# Patient Record
Sex: FEMALE | Race: WHITE | NOT HISPANIC OR LATINO
[De-identification: names, ages, dates, MRNs, and addresses within clinical notes are randomized per-mention and may not be internally consistent; named-entity substitution may affect disease eponyms.]

---

## 2018-06-26 PROBLEM — Z00.00 ENCOUNTER FOR PREVENTIVE HEALTH EXAMINATION: Status: ACTIVE | Noted: 2018-06-26

## 2018-07-26 ENCOUNTER — APPOINTMENT (OUTPATIENT)
Dept: BREAST CENTER | Facility: CLINIC | Age: 38
End: 2018-07-26
Payer: COMMERCIAL

## 2018-07-26 VITALS
HEART RATE: 81 BPM | HEIGHT: 65 IN | SYSTOLIC BLOOD PRESSURE: 132 MMHG | DIASTOLIC BLOOD PRESSURE: 92 MMHG | WEIGHT: 170 LBS | BODY MASS INDEX: 28.32 KG/M2

## 2018-07-26 DIAGNOSIS — Z78.9 OTHER SPECIFIED HEALTH STATUS: ICD-10-CM

## 2018-07-26 DIAGNOSIS — Z85.3 PERSONAL HISTORY OF MALIGNANT NEOPLASM OF BREAST: ICD-10-CM

## 2018-07-26 DIAGNOSIS — Z80.51 FAMILY HISTORY OF MALIGNANT NEOPLASM OF KIDNEY: ICD-10-CM

## 2018-07-26 PROCEDURE — 99214 OFFICE O/P EST MOD 30 MIN: CPT

## 2018-11-15 ENCOUNTER — RECORD ABSTRACTING (OUTPATIENT)
Age: 38
End: 2018-11-15

## 2018-11-15 DIAGNOSIS — G43.909 MIGRAINE, UNSPECIFIED, NOT INTRACTABLE, W/OUT STATUS MIGRAINOSUS: ICD-10-CM

## 2018-11-15 LAB — CYTOLOGY CVX/VAG DOC THIN PREP: NORMAL

## 2018-12-04 ENCOUNTER — APPOINTMENT (OUTPATIENT)
Dept: OBGYN | Facility: CLINIC | Age: 38
End: 2018-12-04

## 2019-01-11 ENCOUNTER — APPOINTMENT (OUTPATIENT)
Dept: OBGYN | Facility: HOSPITAL | Age: 39
End: 2019-01-11

## 2019-01-31 ENCOUNTER — APPOINTMENT (OUTPATIENT)
Dept: BREAST CENTER | Facility: CLINIC | Age: 39
End: 2019-01-31
Payer: COMMERCIAL

## 2019-01-31 VITALS
DIASTOLIC BLOOD PRESSURE: 77 MMHG | WEIGHT: 163 LBS | HEIGHT: 65 IN | SYSTOLIC BLOOD PRESSURE: 118 MMHG | BODY MASS INDEX: 27.16 KG/M2 | HEART RATE: 80 BPM

## 2019-01-31 PROCEDURE — 99214 OFFICE O/P EST MOD 30 MIN: CPT

## 2019-01-31 RX ORDER — CHROMIUM 200 MCG
1000 TABLET ORAL DAILY
Refills: 0 | Status: DISCONTINUED | COMMUNITY
End: 2019-01-31

## 2019-01-31 RX ORDER — ASPIRIN 81 MG
81 TABLET,CHEWABLE ORAL
Refills: 0 | Status: ACTIVE | COMMUNITY

## 2019-01-31 RX ORDER — LEUPROLIDE ACETATE 11.25 MG
KIT INTRAMUSCULAR
Refills: 0 | Status: DISCONTINUED | COMMUNITY
End: 2019-01-31

## 2019-01-31 NOTE — HISTORY OF PRESENT ILLNESS
[FreeTextEntry1] : S/P L Exc Bx (8/27/07): +1.5cm IDCA, +margin, ER+, VA+, Her2 3+\par S/P L Re-Exc/L Ax Diss (10/5/07): No resid Ca,  +2/12 LN\par L Stage IIA (T1N1M0) IDCA\par S/P chemo+Herceptin (Mittelman)\par S/P RT\par Started tmx/Lupron/ASAb (4/08) > Lupron d/c'ed after BSO\par BRCA (9/07): -\par S/P Lap BSO (1/19)([facility]): pending\par No other MH/FH changes. ROS reviewed/discussed. Taking Ca/Vit D. Last Bone Densitometry (5/16): +osteopenia\par Mammo/Sono (6/22/18): NSF

## 2019-01-31 NOTE — PHYSICAL EXAM
[Normocephalic] : normocephalic [Atraumatic] : atraumatic [Supple] : supple [No Supraclavicular Adenopathy] : no supraclavicular adenopathy [No Cervical Adenopathy] : no cervical adenopathy [No Thyromegaly] : no thyromegaly [Normal Sinus Rhythm] : normal sinus rhythm [Examined in the supine and seated position] : examined in the supine and seated position [No dominant masses] : no dominant masses in right breast  [No dominant masses] : no dominant masses left breast [No Nipple Retraction] : no left nipple retraction [No Nipple Discharge] : no left nipple discharge [No Axillary Lymphadenopathy] : no left axillary lymphadenopathy [No Edema] : no edema [No Rashes] : no rashes [No Ulceration] : no ulceration [de-identified] : PMX/Ax/RT. NER. %. No lymphedema.

## 2019-02-06 ENCOUNTER — APPOINTMENT (OUTPATIENT)
Dept: OBGYN | Facility: CLINIC | Age: 39
End: 2019-02-06
Payer: COMMERCIAL

## 2019-02-06 VITALS
BODY MASS INDEX: 27.99 KG/M2 | DIASTOLIC BLOOD PRESSURE: 70 MMHG | WEIGHT: 168 LBS | SYSTOLIC BLOOD PRESSURE: 118 MMHG | HEIGHT: 65 IN

## 2019-02-06 DIAGNOSIS — Z12.31 ENCOUNTER FOR SCREENING MAMMOGRAM FOR MALIGNANT NEOPLASM OF BREAST: ICD-10-CM

## 2019-02-06 PROCEDURE — 99024 POSTOP FOLLOW-UP VISIT: CPT

## 2019-02-06 NOTE — HISTORY OF PRESENT ILLNESS
[Clean/Dry/Intact] : clean, dry and intact [Healed] : healed [Doing Well] : is doing well [No Sign of Infection] : is showing no signs of infection [Sutures Removed] : sutures were removed

## 2019-09-12 ENCOUNTER — APPOINTMENT (OUTPATIENT)
Dept: BREAST CENTER | Facility: CLINIC | Age: 39
End: 2019-09-12
Payer: COMMERCIAL

## 2019-09-12 VITALS
HEIGHT: 65 IN | HEART RATE: 85 BPM | BODY MASS INDEX: 28.32 KG/M2 | DIASTOLIC BLOOD PRESSURE: 85 MMHG | WEIGHT: 170 LBS | SYSTOLIC BLOOD PRESSURE: 122 MMHG

## 2019-09-12 DIAGNOSIS — Z98.890 OTHER SPECIFIED POSTPROCEDURAL STATES: ICD-10-CM

## 2019-09-12 PROCEDURE — 99214 OFFICE O/P EST MOD 30 MIN: CPT

## 2019-09-12 NOTE — HISTORY OF PRESENT ILLNESS
[FreeTextEntry1] : S/P L Exc Bx (8/27/07): +1.5cm IDCA, +margin, ER+, [place]+, Her2 3+\par S/P L Re-Exc/L Ax Diss (10/5/07): No resid Ca,  +2/12 LN\par L Stage IIA (T1N1M0) IDCA\par S/P chemo+Herceptin (Mittelman)\par S/P RT\par Started tmx/Lupron/ASAb (4/08) > Lupron d/c'ed after BSO\par BRCA (9/07): -\par S/P R Sono Core Bx (R 11:00)(9/19/07): Benign\par S/P Lap BSO (1/19)([facility]): Benign\par No other MH/FH changes. ROS reviewed/discussed. Taking Ca/Vit D. Last Bone Densitometry (5/16): +osteopenia\par Mammo/Sono (7/5/19): HD, NSF

## 2019-09-12 NOTE — PHYSICAL EXAM
[Normocephalic] : normocephalic [Atraumatic] : atraumatic [Supple] : supple [No Supraclavicular Adenopathy] : no supraclavicular adenopathy [No Thyromegaly] : no thyromegaly [Normal Sinus Rhythm] : normal sinus rhythm [Examined in the supine and seated position] : examined in the supine and seated position [No dominant masses] : no dominant masses in right breast  [No dominant masses] : no dominant masses left breast [No Nipple Retraction] : no left nipple retraction [No Nipple Discharge] : no left nipple discharge [No Axillary Lymphadenopathy] : no left axillary lymphadenopathy [No Edema] : no edema [No Rashes] : no rashes [No Ulceration] : no ulceration [de-identified] : +tender nod th R 8:00 N5 > observe [de-identified] : S/P PMX/Ax/RT. NER. %. No lymphedema.

## 2019-10-10 ENCOUNTER — APPOINTMENT (OUTPATIENT)
Dept: OBGYN | Facility: CLINIC | Age: 39
End: 2019-10-10
Payer: COMMERCIAL

## 2019-10-10 PROCEDURE — 76830 TRANSVAGINAL US NON-OB: CPT

## 2019-10-10 PROCEDURE — 99396 PREV VISIT EST AGE 40-64: CPT

## 2020-05-05 VITALS
SYSTOLIC BLOOD PRESSURE: 130 MMHG | DIASTOLIC BLOOD PRESSURE: 79 MMHG | BODY MASS INDEX: 24.75 KG/M2 | HEIGHT: 64 IN | HEART RATE: 71 BPM | WEIGHT: 145 LBS

## 2020-09-21 ENCOUNTER — APPOINTMENT (OUTPATIENT)
Dept: BREAST CENTER | Facility: CLINIC | Age: 40
End: 2020-09-21
Payer: COMMERCIAL

## 2020-09-21 VITALS
DIASTOLIC BLOOD PRESSURE: 78 MMHG | WEIGHT: 170 LBS | HEART RATE: 90 BPM | HEIGHT: 65 IN | BODY MASS INDEX: 28.32 KG/M2 | SYSTOLIC BLOOD PRESSURE: 127 MMHG

## 2020-09-21 PROCEDURE — 99214 OFFICE O/P EST MOD 30 MIN: CPT

## 2020-09-21 RX ORDER — ERGOCALCIFEROL 1.25 MG/1
1.25 MG CAPSULE, LIQUID FILLED ORAL
Qty: 12 | Refills: 0 | Status: ACTIVE | COMMUNITY
Start: 2020-04-28

## 2020-09-21 NOTE — HISTORY OF PRESENT ILLNESS
[FreeTextEntry1] : S/P L Exc Bx (8/27/07): +1.5cm IDCA, +margin, ER+, [place]+, Her2 3+\par S/P L Re-Exc/L Ax Diss (10/5/07): No resid Ca,  +2/12 LN\par L Stage IIA (T1N1M0) IDCA\par S/P chemo+Herceptin (Mittelman)\par S/P RT\par Started tmx/Lupron/ASAb (4/08) > Lupron d/c'ed after BSO\par BRCA (9/07): -\par S/P R Sono Core Bx (R 11:00)(9/19/07): Benign\par S/P Lap BSO (1/19)([facility]): Benign\par No other MH/FH changes. ROS reviewed/discussed. Taking Ca/Vit D. Last Bone Densitometry (5/16): +osteopenia\par Mammo/Sono (7/6/20): HD, NSF

## 2020-09-21 NOTE — PHYSICAL EXAM
[Normocephalic] : normocephalic [Atraumatic] : atraumatic [Supple] : supple [No Supraclavicular Adenopathy] : no supraclavicular adenopathy [No Cervical Adenopathy] : no cervical adenopathy [No Thyromegaly] : no thyromegaly [Normal Sinus Rhythm] : normal sinus rhythm [Examined in the supine and seated position] : examined in the supine and seated position [No dominant masses] : no dominant masses in right breast  [No dominant masses] : no dominant masses left breast [No Nipple Retraction] : no left nipple retraction [No Nipple Discharge] : no left nipple discharge [No Axillary Lymphadenopathy] : no left axillary lymphadenopathy [No Edema] : no edema [No Rashes] : no rashes [No Ulceration] : no ulceration [de-identified] : +stable sl tender FF th R 8:00 N5 [de-identified] : S/P PMX/Ax/RT. NER. %. No lymphedema.

## 2020-10-19 ENCOUNTER — APPOINTMENT (OUTPATIENT)
Dept: OBGYN | Facility: CLINIC | Age: 40
End: 2020-10-19
Payer: COMMERCIAL

## 2020-10-19 VITALS
BODY MASS INDEX: 29.32 KG/M2 | HEIGHT: 65 IN | SYSTOLIC BLOOD PRESSURE: 116 MMHG | DIASTOLIC BLOOD PRESSURE: 70 MMHG | TEMPERATURE: 97.7 F | WEIGHT: 176 LBS

## 2020-10-19 PROCEDURE — 99396 PREV VISIT EST AGE 40-64: CPT

## 2020-10-19 NOTE — HISTORY OF PRESENT ILLNESS
[FreeTextEntry1] : 41yo  postmenopausal(Lupron induced)on Tamoxifen  since treatment for ER/IN + [id number] Negative Left breast Ca (Lumpectomy/SNB, chemo,RT) followed by Laparoscopic BSO 19 here for annual Gyn exam. Pt has had no vaginal staining or bleeding since last year. Pt had been on Tamoxifen and Lupron(Dr. Joshua) x 10 years and stopped Tamoxifen in 2018. When Lupron was temporarily stopped ovarian function returned therefore pt strongly desired BSO to remain without any Estrogen production. [Mammogramdate] : 7/6/20 [TextBox_19] : [facility] BIRADS 2D [BreastSonogramDate] : 7/6/20 [PapSmeardate] : 10/4/18 [TextBox_25] : BIRADS 2D [TextBox_31] : Neg/HPV Neg [TextBox_37] : T Score Spine -1.2 Hip -0.7 Fem Neck -1.3 [BoneDensityDate] : 6/22/18

## 2021-01-21 ENCOUNTER — APPOINTMENT (OUTPATIENT)
Dept: OBGYN | Facility: CLINIC | Age: 41
End: 2021-01-21

## 2021-03-25 ENCOUNTER — APPOINTMENT (OUTPATIENT)
Dept: BREAST CENTER | Facility: CLINIC | Age: 41
End: 2021-03-25
Payer: COMMERCIAL

## 2021-03-25 VITALS
HEIGHT: 65 IN | DIASTOLIC BLOOD PRESSURE: 85 MMHG | SYSTOLIC BLOOD PRESSURE: 139 MMHG | BODY MASS INDEX: 27.32 KG/M2 | WEIGHT: 164 LBS | HEART RATE: 85 BPM

## 2021-03-25 PROCEDURE — 99214 OFFICE O/P EST MOD 30 MIN: CPT

## 2021-03-25 PROCEDURE — 99072 ADDL SUPL MATRL&STAF TM PHE: CPT

## 2021-03-25 PROCEDURE — 99213 OFFICE O/P EST LOW 20 MIN: CPT

## 2021-03-25 NOTE — HISTORY OF PRESENT ILLNESS
[FreeTextEntry1] : S/P L Exc Bx (8/27/07): +1.5cm IDCA, +margin, ER+, [place]+, Her2 3+\par S/P L Re-Exc/L Ax Diss (10/5/07): No resid Ca,  +2/12 LN\par L Stage IIA (T1N1M0) IDCA\par S/P chemo+Herceptin (Mittelman)\par S/P RT\par Started tmx/Lupron/ASAb (4/08) > Lupron d/c'ed after BSO\par BRCA (9/07): -\par S/P R Sono Core Bx (R 11:00)(9/19/07): Benign\par S/P Lap BSO (1/19)([facility]): Benign\par Getting second Pfizer next wk\par No other MH/FH changes. ROS reviewed/discussed. Taking Ca/Vit D. Last Bone Densitometry (6/18): +osteopenia\par Mammo/Sono (7/6/20): HD, NSF

## 2021-03-25 NOTE — PHYSICAL EXAM

## 2021-07-18 ENCOUNTER — NON-APPOINTMENT (OUTPATIENT)
Age: 41
End: 2021-07-18

## 2021-09-27 ENCOUNTER — APPOINTMENT (OUTPATIENT)
Dept: BREAST CENTER | Facility: CLINIC | Age: 41
End: 2021-09-27
Payer: COMMERCIAL

## 2021-09-27 VITALS
SYSTOLIC BLOOD PRESSURE: 145 MMHG | HEIGHT: 65 IN | BODY MASS INDEX: 29.66 KG/M2 | WEIGHT: 178 LBS | DIASTOLIC BLOOD PRESSURE: 90 MMHG | HEART RATE: 76 BPM

## 2021-09-27 PROCEDURE — 99214 OFFICE O/P EST MOD 30 MIN: CPT

## 2021-09-27 NOTE — PHYSICAL EXAM

## 2021-09-27 NOTE — HISTORY OF PRESENT ILLNESS
[FreeTextEntry1] : S/P L Exc Bx (8/27/07): +1.5cm IDCA, +margin, ER+, [place]+, Her2 3+\par S/P L Re-Exc/L Ax Diss (10/5/07): No resid Ca,  +2/12 LN\par L Stage IIA (T1N1M0) IDCA\par S/P chemo+Herceptin (Mittelman)\par S/P RT\par Started tmx/Lupron/ASAb (4/08) > Lupron d/c'ed after BSO\par BRCA ([employer] compr)(9/07): -\par S/P R Sono Core Bx (R 11:00)(9/19/07): Benign\par S/P Lap BSO (1/19)([facility]): Benign\par Got second Pfizer (4/21)\par No other MH/FH changes. ROS reviewed/discussed. Taking Ca/Vit D. Last Bone Densitometry (6/18): +osteopenia\par Mammo/Sono (7/7/21): HD, NSF

## 2022-04-06 ENCOUNTER — APPOINTMENT (OUTPATIENT)
Dept: BREAST CENTER | Facility: CLINIC | Age: 42
End: 2022-04-06
Payer: COMMERCIAL

## 2022-04-06 VITALS
WEIGHT: 178 LBS | HEIGHT: 65 IN | BODY MASS INDEX: 29.66 KG/M2 | HEART RATE: 89 BPM | SYSTOLIC BLOOD PRESSURE: 124 MMHG | DIASTOLIC BLOOD PRESSURE: 87 MMHG

## 2022-04-06 DIAGNOSIS — Z87.42 PERSONAL HISTORY OF OTHER DISEASES OF THE FEMALE GENITAL TRACT: ICD-10-CM

## 2022-04-06 PROCEDURE — 99214 OFFICE O/P EST MOD 30 MIN: CPT

## 2022-04-06 NOTE — PHYSICAL EXAM

## 2022-04-06 NOTE — HISTORY OF PRESENT ILLNESS
[FreeTextEntry1] : S/P L Exc Bx (8/27/07): +1.5cm IDCA, +margin, ER+, [place]+, Her2 3+\par S/P L Re-Exc/L Ax Diss (10/5/07): No resid Ca,  +2/12 LN\par L Stage IIA (T1N1M0) IDCA\par S/P chemo+Herceptin (Mittelman)\par S/P RT\par Started tmx/Lupron/ASAb (4/08) > Lupron d/c'ed after BSO\par BRCA ([employer] compr)(9/07): -\par S/P R Sono Core Bx (R 11:00)(9/19/07): Benign\par S/P Lap BSO (1/19)([facility]): Benign\par Got Pfizer booster (11/21)(RUE)\par No other MH/FH changes. ROS reviewed/discussed. Taking Ca/Vit D. Last Bone Densitometry (6/18): +osteopenia\par Mammo/Sono (7/7/21): HD, NSF

## 2022-04-13 ENCOUNTER — APPOINTMENT (OUTPATIENT)
Dept: OBGYN | Facility: CLINIC | Age: 42
End: 2022-04-13
Payer: COMMERCIAL

## 2022-04-13 ENCOUNTER — LABORATORY RESULT (OUTPATIENT)
Age: 42
End: 2022-04-13

## 2022-04-13 ENCOUNTER — NON-APPOINTMENT (OUTPATIENT)
Age: 42
End: 2022-04-13

## 2022-04-13 DIAGNOSIS — Z01.419 ENCOUNTER FOR GYNECOLOGICAL EXAMINATION (GENERAL) (ROUTINE) W/OUT ABNORMAL FINDINGS: ICD-10-CM

## 2022-04-13 PROCEDURE — 99396 PREV VISIT EST AGE 40-64: CPT

## 2022-04-13 NOTE — HISTORY OF PRESENT ILLNESS
[FreeTextEntry1] : 42yo  postmenopausal(Lupron induced)on Tamoxifen since treatment for ER/MA + Nas8Zcc Negative Left breast Ca (Lumpectomy/SNB, chemo,RT) followed by Laparoscopic BSO 19 here for annual Gyn exam. Pt has had no vaginal staining or bleeding but occasionally sees a spot of blood on toilet tissue when she wipes(never on undergarments). Pt had been on Tamoxifen and Lupron(Dr. Joshua) x 10 years and stopped Tamoxifen in 2018. When Lupron was temporarily stopped ovarian function returned therefore pt strongly desired BSO to remain without any Estrogen production. She remains back on Tamoxifen  [Mammogramdate] : 7/7/21 [TextBox_19] : [facility] BIRADS 2D [BreastSonogramDate] : 7/7/21 [TextBox_25] : [facility] BIRADS 2 [PapSmeardate] : 10/4/18 [TextBox_31] : Neg/HPV Neg [BoneDensityDate] : 6/22/18 [TextBox_37] : T Score Spine -1.2 Hip -0.7 Fem Neck -1.3.

## 2022-04-19 LAB — CYTOLOGY CVX/VAG DOC THIN PREP: NORMAL

## 2022-06-14 ENCOUNTER — APPOINTMENT (OUTPATIENT)
Dept: HEMATOLOGY ONCOLOGY | Facility: CLINIC | Age: 42
End: 2022-06-14

## 2022-07-06 ENCOUNTER — NON-APPOINTMENT (OUTPATIENT)
Age: 42
End: 2022-07-06

## 2022-07-06 NOTE — DISCUSSION/SUMMARY
[FreeTextEntry1] : REASON FOR CONSULT\par Isabela Chavis is a 42-year-old female referred by Dr. Leo Gama for cancer genetic counseling and update testing due to a personal and family history of cancer. Ms. Chavis was seen on 2022 as a joint appointment with her mother, Loi Chavis, at which time medical and family history was ascertained and a pedigree constructed. Genetic counselor, Florina Delvalle, was also present for this session.\par \par RELEVANT MEDICAL HISTORY\par Ms. Chavis was diagnosed with a left breast cancer in  at the age of 27. Pathology report revealed invasive ductal carcinoma (ER+/ID+/HER2+). Consequently, she underwent lumpectomy, with chemotherapy, radiation therapy, and endocrine therapy. She pursued genetic testing through [facility] around her diagnosis which was negative for any variants in BRCA1 or BRCA2 (no FEI analysis). \par \par OTHER MEDICAL AND SURGICAL HISTORY:\par •	Medical History: osteopenia, migraines\par •	Surgical History: appendectomy, cervical cryosurgery, wisdom teeth\par \par OB/GYN HISTORY:\par Obstetrical History: \par Age at Menarche: 11\par Menopausal Status: Post-menopausal with LMP late 20s d/t chemo treatments \par Age at First Live Birth: N/A\par Oral Contraceptive Use: Yes, 7 years\par Hormone Replacement Therapy: No\par \par CANCER SCREENING HISTORY:  \par Breast: \par •	Mammography: 21- wnl\par •	Sonography: 21- wnl\par GYN:\par •	Pelvic Examination: 22- wnl\par Colon:\par •	Colonoscopy: No\par Skin:  \par •	FBSE: Yes\par •	Lesions biopsied/removed: No\par \par SOCIAL HISTORY:\par •	Tobacco-product use: Yes, former\par •	Environmental exposures: No\par \par FAMILY HISTORY:\par Maternal ancestry was reported as English/Georgian and paternal ancestry was reported as Tajik. Ashkenazi Zoroastrian ancestry was denied. A detailed family history of cancer was ascertained, see below and scanned chart for pedigree. \par \par According to Ms. Chavis no one else in the family has had germline testing for cancer susceptibility. Consanguinity was denied. \par 	\par RISK ASSESSMENT:\par Ms. Chavis’s personal and family history is suggestive of a hereditary cancer syndrome given her diagnosis of breast cancer at age 27 and her mother’s history of breast cancer. The patient meets National Comprehensive Cancer Network (NCCN) criteria for genetic testing. We recommended update genetic testing for genes associated with breast and gynecological cancer. This test analyzes 19 genes: DAVID, BARD1, BRCA1, BRCA2, BRIP1, CDH1, CHEK2, EPCAM, MLH1, MSH2, MSH6, NF1, PALB2, PMS2, PTEN, RAD51C, RAD51D, STK11, and TP53.\par \par The risks, benefits and limitations of genetic testing were discussed with Ms. Chavis. In addition, we discussed the purpose of genetic testing and possible test results (positive, negative, inconclusive) along with associated medical management options and psychosocial implications. Insurance coverage and potential out of pocket costs were also discussed. \par \par It was explained that risk assessment is based upon medical and family history as provided and may change in the future should new information be obtained. \par \par Following our discussion, Ms. Chavis consented to the above-mentioned genetic testing panel. Blood was drawn in our laboratory and sent to [facility] today.\par \par PLAN:\par \par 1.	Blood drawn today will be sent to [facility] for analysis. \par 2.	We will contact Ms. Chavis to schedule a follow-up appointment once the results are available. Results generally return in 2-3 weeks. \par \par For any additional questions please call Cancer Genetics at [phone]. \par \par \par Maris Omer MS, [facility] [place]par Genetic Counselor, Cancer Genetics\par \par \par CC: \par Leo Gama MD\par \par \par \par

## 2022-07-06 NOTE — DISCUSSION/SUMMARY
[FreeTextEntry1] : REASON FOR CONSULT\par Isabela Chavis is a 42-year-old female who was contacted on July 6, 2022 for a discussion regarding her variant of uncertain significance (VUS) genetic testing results related to hereditary cancer predisposition. This session was conducted via telephone. \par \par Ms. Chavis was originally seen by Cancer Genetics on June 14, 2022 for hereditary cancer predisposition risk assessment due to a personal and family history of cancer. At that time, Ms. Chavis decided to pursue genetic testing for genes associated with breast and gynecological cancers offered by [facility].\par \par TEST RESULTS:  \par \par VARIANT OF UNCERTAIN SIGNIFICANCE (VUS)- DAVID (c.[id number]; [id number])\par \par NO pathogenic (disease-causing) variants or additional VUSs were detected in the following genes [19]:  DAVID, BARD1, BRCA1, BRCA2, BRIP1, CDH1, CHEK2, EPCAM, MLH1, MSH2, MSH6, NF1, PALB2, PMS2, PTEN, RAD51C, RAD51D, STK11, and TP53.\par \par RESULTS INTERPRETATION AND ASSESSMENT:\par At this time the available evidence is insufficient to determine the role of this VUS in disease and the clinical significance of this result is uncertain. Individuals with a pathogenic mutation in the DAVID gene may have an increased risk for breast cancer, pancreatic cancer, and possibly ovarian cancer. It is unknown if the patient has an increased risk for the cancers associated with the DAVID gene at this time. \par \par The detection of this VUS does NOT currently change the patient’s medical management. It is NOT recommended at this time that family members use this result for predictive genetic testing or medical management decisions. With more research, a VUS may be reclassified as either disease-causing or benign. Ms. Chavis was encouraged to contact us every 2-3 years to enquire about any new information for this variant, or sooner if there are any changes in her personal or family history of cancer.  Such updates could possibly change our risk assessment and recommendations.\par \par We discussed that the cause of the Ms. Chavis’s personal and family history of cancer remains unknown and that this result does not rule out a hereditary cancer risk in the patient since it is possible, although unlikely, the patient has a mutation that is not detectable by this analysis or is in an unidentified gene. It is also possible there is a hereditary cancer predisposition in the family, but the patient did not inherit it. \par \par Given Ms. Chavis’s personal and current reported family history of cancer, and her negative genetic test results, the following screening guidelines and risk-reducing recommendations were discussed:\par \par BREAST: \par •	Long-term management and surveillance should be based on Ms. Chavis’s on- or post-treatment protocol as recommended by her breast care team. \par OTHER: \par •	In the absence of other indications, Ms. Chavis should practice age-appropriate cancer screening of other organ systems as recommended for the general population.\par \par PLAN:\par 1.	These results do not change Ms. Chavis’s medical management. Long-term management and surveillance should be based on the patient’s on- or post-treatment protocol as recommended by her breast care team (and general population guidelines for other cancers).\par 2.	Testing of family members for this VUS is not indicated.\par 3.	Patient informed consult note(s) will be available through their [facility] patient portal and genetic test results will be released via [facility]’s portal.\par 4.	Ms. Chavis was encouraged to contact us every 2-3 years to check on any changes in interpretation of the VUS, or sooner if there are changes in her personal or family history of cancer.\par \par For any additional questions please call Cancer Genetics at [phone]. \par \par \par Maris Omer MS, [facility] [place]\par Genetic Counselor, Cancer Genetics\par \par \par CC: \par Leo Gama MD\par \par \par \par \par

## 2022-09-21 ENCOUNTER — NON-APPOINTMENT (OUTPATIENT)
Age: 42
End: 2022-09-21

## 2022-09-21 NOTE — DISCUSSION/SUMMARY
[FreeTextEntry1] : 9/21/22\par \par Informed patient the DAVID variant of uncertain significance (VUS) (c.7235A>G) previously identified on her [facility] breast/gyn panel genetic testing has been reclassified as likely benign. A new report was issued which will be released to the patient via [facility] portal. Patient should continue screening recommendations discussed previously given her personal and family history.\par \par Maris Omer MS, [facility] [place]\par Genetic Counselor\par

## 2022-11-14 ENCOUNTER — APPOINTMENT (OUTPATIENT)
Dept: BREAST CENTER | Facility: CLINIC | Age: 42
End: 2022-11-14

## 2022-11-14 VITALS
HEIGHT: 65 IN | BODY MASS INDEX: 29.16 KG/M2 | HEART RATE: 91 BPM | SYSTOLIC BLOOD PRESSURE: 139 MMHG | WEIGHT: 175 LBS | DIASTOLIC BLOOD PRESSURE: 93 MMHG

## 2022-11-14 PROCEDURE — 99214 OFFICE O/P EST MOD 30 MIN: CPT

## 2022-11-14 RX ORDER — TAMOXIFEN CITRATE 20 MG/1
20 TABLET, FILM COATED ORAL
Refills: 0 | Status: DISCONTINUED | COMMUNITY
End: 2022-11-14

## 2022-11-14 NOTE — HISTORY OF PRESENT ILLNESS
[FreeTextEntry1] : S/P L Exc Bx (8/27/07): +1.5cm IDCA, +margin, ER+, [place]+, Her2 3+\par S/P L Re-Exc/L Ax Diss (10/5/07): No resid Ca,  +2/12 LN\par L Stage IIA (T1N1M0) IDCA\par S/P chemo+Herceptin (Mittelman)\par S/P RT\par Started tmx/Lupron/ASAb (4/08) > Lupron d/c'ed after BSO\par BRCA ([employer] compr)(9/07): -\par BRCA ([employer] Panel)(6/22): BRCA-, +DAVID VUS > likely benign\par S/P R Sono Core Bx (R 11:00)(9/19/07): Benign\par S/P Lap BSO (1/19)([facility]): Benign\par HAd COVID (7/22) > Paxlovid > recovered\par Got Pfizer booster (11/21)(RUE)\par No other MH/FH changes. ROS reviewed/discussed. Taking Ca/Vit D. Last Bone Densitometry (6/18): +osteopenia\par Mammo/Sono (7/18/22): HD, NSF

## 2022-11-14 NOTE — PHYSICAL EXAM

## 2023-05-11 ENCOUNTER — APPOINTMENT (OUTPATIENT)
Dept: BREAST CENTER | Facility: CLINIC | Age: 43
End: 2023-05-11
Payer: COMMERCIAL

## 2023-05-11 VITALS
HEART RATE: 82 BPM | HEIGHT: 65 IN | SYSTOLIC BLOOD PRESSURE: 135 MMHG | WEIGHT: 170 LBS | BODY MASS INDEX: 28.32 KG/M2 | DIASTOLIC BLOOD PRESSURE: 84 MMHG

## 2023-05-11 PROCEDURE — 99214 OFFICE O/P EST MOD 30 MIN: CPT

## 2023-05-11 RX ORDER — ALPRAZOLAM 0.25 MG/1
0.25 TABLET ORAL
Qty: 10 | Refills: 0 | Status: DISCONTINUED | COMMUNITY
Start: 2022-10-03 | End: 2023-05-11

## 2023-05-11 RX ORDER — ADHESIVE TAPE 3"X 2.3 YD
50 MCG TAPE, NON-MEDICATED TOPICAL
Refills: 0 | Status: DISCONTINUED | COMMUNITY
End: 2023-05-11

## 2023-05-11 RX ORDER — AZITHROMYCIN 250 MG/1
250 TABLET, FILM COATED ORAL
Qty: 6 | Refills: 0 | Status: DISCONTINUED | COMMUNITY
Start: 2022-10-03 | End: 2023-05-11

## 2023-05-11 RX ORDER — NIRMATRELVIR AND RITONAVIR 300-100 MG
20 X 150 MG & KIT ORAL
Qty: 30 | Refills: 0 | Status: DISCONTINUED | COMMUNITY
Start: 2022-07-05 | End: 2023-05-11

## 2023-05-11 NOTE — PHYSICAL EXAM
[Normocephalic] : normocephalic [Atraumatic] : atraumatic [Supple] : supple [No Supraclavicular Adenopathy] : no supraclavicular adenopathy [No Cervical Adenopathy] : no cervical adenopathy [No Thyromegaly] : no thyromegaly [Normal Sinus Rhythm] : normal sinus rhythm [Examined in the supine and seated position] : examined in the supine and seated position [No dominant masses] : no dominant masses in right breast  [No dominant masses] : no dominant masses left breast [No Nipple Retraction] : no left nipple retraction [No Nipple Discharge] : no left nipple discharge [No Axillary Lymphadenopathy] : no left axillary lymphadenopathy [No Edema] : no edema [No Rashes] : no rashes [No Ulceration] : no ulceration [de-identified] : +dense FC tissue\par NSF  [de-identified] : S/P PMX/Ax/RT. NER. %. No lymphedema.

## 2023-05-11 NOTE — HISTORY OF PRESENT ILLNESS
[FreeTextEntry1] : S/P L Exc Bx (8/27/07): +1.5cm IDCA, +margin, ER+, [place]+, Her2 3+\par S/P L Re-Exc/L Ax Diss (10/5/07): No resid Ca,  +2/12 LN\par L Stage IIA (T1N1M0) IDCA\par S/P chemo+Herceptin (Mittelman)\par S/P RT\par Started tmx/Lupron/ASAb (4/08) > Lupron d/c'ed after BSO\par BRCA ([employer] compr)(9/07): -\par BRCA ([employer] Panel)(6/22): BRCA-, +DAVID VUS > likely benign\par S/P R Sono Core Bx (R 11:00)(9/19/07): Benign\par S/P Lap BSO (1/19)([facility]): Benign\par PAP/Pelvic (2022): "WNL" \par Had COVID (7/22) > Paxlovid > recovered\par Got Pfizer booster (11/21)(RUE)\par No other MH/FH changes. ROS reviewed/discussed. Taking Ca/Vit D. Last Bone Densitometry (6/18): +osteopenia\par Mammo/Sono (7/18/22): HD, NSF

## 2023-12-27 ENCOUNTER — APPOINTMENT (OUTPATIENT)
Dept: BREAST CENTER | Facility: CLINIC | Age: 43
End: 2023-12-27
Payer: COMMERCIAL

## 2023-12-27 VITALS
BODY MASS INDEX: 29.16 KG/M2 | HEART RATE: 102 BPM | WEIGHT: 175 LBS | SYSTOLIC BLOOD PRESSURE: 137 MMHG | HEIGHT: 65 IN | DIASTOLIC BLOOD PRESSURE: 88 MMHG

## 2023-12-27 DIAGNOSIS — R92.30 INCONCLUSIVE MAMMOGRAM: ICD-10-CM

## 2023-12-27 DIAGNOSIS — Z80.3 FAMILY HISTORY OF MALIGNANT NEOPLASM OF BREAST: ICD-10-CM

## 2023-12-27 DIAGNOSIS — C77.3 MALIGNANT NEOPLASM OF UNSPECIFIED SITE OF LEFT FEMALE BREAST: ICD-10-CM

## 2023-12-27 DIAGNOSIS — R92.2 INCONCLUSIVE MAMMOGRAM: ICD-10-CM

## 2023-12-27 DIAGNOSIS — Z86.16 PERSONAL HISTORY OF COVID-19: ICD-10-CM

## 2023-12-27 DIAGNOSIS — Z92.21 PERSONAL HISTORY OF ANTINEOPLASTIC CHEMOTHERAPY: ICD-10-CM

## 2023-12-27 DIAGNOSIS — M85.80 OTHER SPECIFIED DISORDERS OF BONE DENSITY AND STRUCTURE, UNSPECIFIED SITE: ICD-10-CM

## 2023-12-27 DIAGNOSIS — Z92.3 PERSONAL HISTORY OF IRRADIATION: ICD-10-CM

## 2023-12-27 DIAGNOSIS — C50.912 MALIGNANT NEOPLASM OF UNSPECIFIED SITE OF LEFT FEMALE BREAST: ICD-10-CM

## 2023-12-27 PROCEDURE — 99214 OFFICE O/P EST MOD 30 MIN: CPT

## 2023-12-27 NOTE — HISTORY OF PRESENT ILLNESS
[FreeTextEntry1] : S/P L Exc Bx (8/27/07): +1.5cm IDCA, +margin, ER+, [place]+, Her2 3+ S/P L Re-Exc/L Ax Diss (10/5/07): No resid Ca,  +2/12 LN L Stage IIA (T1N1M0) IDCA S/P chemo+Herceptin (Mittelman) S/P RT Started tmx/Lupron/ASAb (4/08) > Lupron d/c'ed after BSO BRCA ([facility] compr)(9/07): - BRCA ([facility] Panel)(6/22): BRCA-, +DAVID VUS > likely benign S/P R Sono Core Bx (R 11:00)(9/19/07): Benign S/P Lap BSO (1/19)([facility]ridges): Benign PAP/Pelvic (2022): "WNL"  Had COVID (7/22) > Paxlovid > recovered Got Pfizer booster (11/21)(RUE) No other MH/FH changes. ROS reviewed/discussed. Taking Ca/Vit D. Last Bone Densitometry (6/18): +osteopenia Mammo/Sono (7/20/23): HD, NSF

## 2024-06-02 ENCOUNTER — NON-APPOINTMENT (OUTPATIENT)
Age: 44
End: 2024-06-02

## 2024-07-24 ENCOUNTER — NON-APPOINTMENT (OUTPATIENT)
Age: 44
End: 2024-07-24

## 2024-07-25 ENCOUNTER — APPOINTMENT (OUTPATIENT)
Dept: BREAST CENTER | Facility: CLINIC | Age: 44
End: 2024-07-25
Payer: COMMERCIAL

## 2024-07-25 VITALS — WEIGHT: 175 LBS | BODY MASS INDEX: 29.16 KG/M2 | HEIGHT: 65 IN

## 2024-07-25 VITALS — HEART RATE: 105 BPM | DIASTOLIC BLOOD PRESSURE: 81 MMHG | SYSTOLIC BLOOD PRESSURE: 133 MMHG

## 2024-07-25 DIAGNOSIS — C50.912 MALIGNANT NEOPLASM OF UNSPECIFIED SITE OF LEFT FEMALE BREAST: ICD-10-CM

## 2024-07-25 DIAGNOSIS — Z86.16 PERSONAL HISTORY OF COVID-19: ICD-10-CM

## 2024-07-25 DIAGNOSIS — C77.3 MALIGNANT NEOPLASM OF UNSPECIFIED SITE OF LEFT FEMALE BREAST: ICD-10-CM

## 2024-07-25 DIAGNOSIS — Z92.3 PERSONAL HISTORY OF IRRADIATION: ICD-10-CM

## 2024-07-25 DIAGNOSIS — Z80.3 FAMILY HISTORY OF MALIGNANT NEOPLASM OF BREAST: ICD-10-CM

## 2024-07-25 DIAGNOSIS — Z92.21 PERSONAL HISTORY OF ANTINEOPLASTIC CHEMOTHERAPY: ICD-10-CM

## 2024-07-25 DIAGNOSIS — M85.80 OTHER SPECIFIED DISORDERS OF BONE DENSITY AND STRUCTURE, UNSPECIFIED SITE: ICD-10-CM

## 2024-07-25 DIAGNOSIS — R92.30 DENSE BREASTS, UNSPECIFIED: ICD-10-CM

## 2024-07-25 PROCEDURE — 99213 OFFICE O/P EST LOW 20 MIN: CPT

## 2024-07-25 NOTE — PHYSICAL EXAM
[Normocephalic] : normocephalic [Atraumatic] : atraumatic [Supple] : supple [No Supraclavicular Adenopathy] : no supraclavicular adenopathy [No Cervical Adenopathy] : no cervical adenopathy [No Thyromegaly] : no thyromegaly [Normal Sinus Rhythm] : normal sinus rhythm [Examined in the supine and seated position] : examined in the supine and seated position [No dominant masses] : no dominant masses in right breast  [No dominant masses] : no dominant masses left breast [No Nipple Retraction] : no left nipple retraction [No Nipple Discharge] : no left nipple discharge [No Axillary Lymphadenopathy] : no left axillary lymphadenopathy [No Edema] : no edema [No Rashes] : no rashes [No Ulceration] : no ulceration [de-identified] : +FC tissue NSF  [de-identified] : S/P PMX/Ax/RT. NER. %. No lymphedema.

## 2024-07-25 NOTE — HISTORY OF PRESENT ILLNESS
[FreeTextEntry1] : S/P L Exc Bx (8/27/07): +1.5cm IDCA, +margin, ER+, [place]+, Her2 3+ S/P L Re-Exc/L Ax Diss (10/5/07): No resid Ca,  +2/12 LN L Stage IIA (T1N1M0) IDCA S/P chemo+Herceptin ([facility]) S/P RT Started tmx/Lupron/ASAb (4/08) > Lupron d/c'ed after BSO BRCA ([employer] compr)(9/07): - BRCA ([facility] Panel)(6/22): BRCA-, +DAVID VUS > likely benign S/P R Sono Core Bx (R 11:00)(9/19/07): Benign S/P Lap BSO (1/19)([facility]ridges): Benign Colonoscopy discussed/rec'ed PAP/Pelvic (2022): "WNL"  Had COVID (7/22) > Paxlovid > recovered Got Pfizer booster (11/21)(RUE) No other MH/FH changes. ROS reviewed/discussed. Taking Ca/Vit D. Last Bone Densitometry (6/18): +osteopenia Mammo/Sono (7/20/23): HD, NSF

## 2025-01-16 ENCOUNTER — NON-APPOINTMENT (OUTPATIENT)
Age: 45
End: 2025-01-16

## 2025-01-30 ENCOUNTER — APPOINTMENT (OUTPATIENT)
Dept: BREAST CENTER | Facility: CLINIC | Age: 45
End: 2025-01-30
Payer: COMMERCIAL

## 2025-01-30 VITALS
BODY MASS INDEX: 28.99 KG/M2 | SYSTOLIC BLOOD PRESSURE: 136 MMHG | DIASTOLIC BLOOD PRESSURE: 88 MMHG | HEART RATE: 71 BPM | HEIGHT: 65 IN | WEIGHT: 174 LBS

## 2025-01-30 DIAGNOSIS — C77.3 MALIGNANT NEOPLASM OF UNSPECIFIED SITE OF LEFT FEMALE BREAST: ICD-10-CM

## 2025-01-30 DIAGNOSIS — Z92.21 PERSONAL HISTORY OF ANTINEOPLASTIC CHEMOTHERAPY: ICD-10-CM

## 2025-01-30 DIAGNOSIS — R92.30 DENSE BREASTS, UNSPECIFIED: ICD-10-CM

## 2025-01-30 DIAGNOSIS — C50.912 MALIGNANT NEOPLASM OF UNSPECIFIED SITE OF LEFT FEMALE BREAST: ICD-10-CM

## 2025-01-30 DIAGNOSIS — M85.80 OTHER SPECIFIED DISORDERS OF BONE DENSITY AND STRUCTURE, UNSPECIFIED SITE: ICD-10-CM

## 2025-01-30 DIAGNOSIS — Z80.3 FAMILY HISTORY OF MALIGNANT NEOPLASM OF BREAST: ICD-10-CM

## 2025-01-30 DIAGNOSIS — Z92.3 PERSONAL HISTORY OF IRRADIATION: ICD-10-CM

## 2025-01-30 DIAGNOSIS — Z86.16 PERSONAL HISTORY OF COVID-19: ICD-10-CM

## 2025-01-30 PROCEDURE — 99213 OFFICE O/P EST LOW 20 MIN: CPT

## 2025-06-14 ENCOUNTER — NON-APPOINTMENT (OUTPATIENT)
Age: 45
End: 2025-06-14

## 2025-08-18 ENCOUNTER — NON-APPOINTMENT (OUTPATIENT)
Age: 45
End: 2025-08-18

## 2025-08-20 ENCOUNTER — APPOINTMENT (OUTPATIENT)
Dept: BREAST CENTER | Facility: CLINIC | Age: 45
End: 2025-08-20
Payer: COMMERCIAL

## 2025-08-20 VITALS
HEART RATE: 80 BPM | SYSTOLIC BLOOD PRESSURE: 124 MMHG | HEIGHT: 65 IN | BODY MASS INDEX: 29.16 KG/M2 | DIASTOLIC BLOOD PRESSURE: 75 MMHG | WEIGHT: 175 LBS

## 2025-08-20 DIAGNOSIS — C50.912 MALIGNANT NEOPLASM OF UNSPECIFIED SITE OF LEFT FEMALE BREAST: ICD-10-CM

## 2025-08-20 DIAGNOSIS — Z80.3 FAMILY HISTORY OF MALIGNANT NEOPLASM OF BREAST: ICD-10-CM

## 2025-08-20 DIAGNOSIS — C77.3 MALIGNANT NEOPLASM OF UNSPECIFIED SITE OF LEFT FEMALE BREAST: ICD-10-CM

## 2025-08-20 DIAGNOSIS — R92.30 DENSE BREASTS, UNSPECIFIED: ICD-10-CM

## 2025-08-20 DIAGNOSIS — Z92.3 PERSONAL HISTORY OF IRRADIATION: ICD-10-CM

## 2025-08-20 DIAGNOSIS — Z86.16 PERSONAL HISTORY OF COVID-19: ICD-10-CM

## 2025-08-20 DIAGNOSIS — M85.80 OTHER SPECIFIED DISORDERS OF BONE DENSITY AND STRUCTURE, UNSPECIFIED SITE: ICD-10-CM

## 2025-08-20 DIAGNOSIS — Z92.21 PERSONAL HISTORY OF ANTINEOPLASTIC CHEMOTHERAPY: ICD-10-CM

## 2025-08-20 PROCEDURE — 99213 OFFICE O/P EST LOW 20 MIN: CPT
